# Patient Record
Sex: MALE | Race: WHITE | Employment: OTHER | URBAN - METROPOLITAN AREA
[De-identification: names, ages, dates, MRNs, and addresses within clinical notes are randomized per-mention and may not be internally consistent; named-entity substitution may affect disease eponyms.]

---

## 2023-09-26 ENCOUNTER — APPOINTMENT (OUTPATIENT)
Dept: CT IMAGING | Facility: HOSPITAL | Age: 48
End: 2023-09-26
Attending: EMERGENCY MEDICINE

## 2023-09-26 ENCOUNTER — HOSPITAL ENCOUNTER (EMERGENCY)
Facility: HOSPITAL | Age: 48
Discharge: HOME | End: 2023-09-27
Attending: EMERGENCY MEDICINE

## 2023-09-26 ENCOUNTER — APPOINTMENT (OUTPATIENT)
Dept: GENERAL RADIOLOGY | Facility: HOSPITAL | Age: 48
End: 2023-09-26
Attending: EMERGENCY MEDICINE

## 2023-09-27 NOTE — DISCHARGE INSTRUCTIONS
On your chest x-ray today there was some mild fullness as described by the radiologist on the right side near the medial part of your lung. They did recommend follow-up. This can be in the form of a repeat chest x-ray or CT.

## 2023-09-27 NOTE — ED INITIAL ASSESSMENT (HPI)
The patient reports black stools with bright red blood noticed for the first time today with some abdominal bloating. The patient reports 3 weeks of feeling lightheaded and bilateral lower back pain for 6 months.

## 2023-09-28 NOTE — CM/SW NOTE
Per Moriah, patient was asking questions about his medication being covered by Medicaid. [facility] informed him that after speaking with his [facility] pharmacy in [facility] at [phone]  :   1) Omeprazole (Prilosec) - this medication is over the counter. No prescription needed. 2) Medrol Dosepak - this medication was resent and should be covered by your insurance. 3) Albuterol (ventolin HFA) - this medication is covered in the generic form and may need a new prescription from your Primary care physician. Informed by [facility] pharmacy that they will contact your PCP with any further needs regarding your medication.

## 2023-10-02 ENCOUNTER — TELEPHONE (OUTPATIENT)
Facility: CLINIC | Age: 48
End: 2023-10-02

## 2023-10-02 ENCOUNTER — OFFICE VISIT (OUTPATIENT)
Facility: CLINIC | Age: 48
End: 2023-10-02

## 2023-10-02 VITALS
BODY MASS INDEX: 30.88 KG/M2 | WEIGHT: 228 LBS | HEIGHT: 72 IN | DIASTOLIC BLOOD PRESSURE: 82 MMHG | SYSTOLIC BLOOD PRESSURE: 129 MMHG | HEART RATE: 67 BPM

## 2023-10-02 DIAGNOSIS — D50.9 IRON DEFICIENCY ANEMIA, UNSPECIFIED IRON DEFICIENCY ANEMIA TYPE: ICD-10-CM

## 2023-10-02 DIAGNOSIS — K21.9 GASTROESOPHAGEAL REFLUX DISEASE, UNSPECIFIED WHETHER ESOPHAGITIS PRESENT: Primary | ICD-10-CM

## 2023-10-02 DIAGNOSIS — K92.1 BLOOD IN STOOL: ICD-10-CM

## 2023-10-02 DIAGNOSIS — R19.5 OCCULT BLOOD POSITIVE STOOL: ICD-10-CM

## 2023-10-02 DIAGNOSIS — R10.13 EPIGASTRIC PAIN: Primary | ICD-10-CM

## 2023-10-02 DIAGNOSIS — R10.13 DYSPEPSIA: ICD-10-CM

## 2023-10-02 DIAGNOSIS — R19.4 CHANGE IN BOWEL HABITS: ICD-10-CM

## 2023-10-02 DIAGNOSIS — E61.1 IRON DEFICIENCY: ICD-10-CM

## 2023-10-02 RX ORDER — POLYETHYLENE GLYCOL 3350, SODIUM SULFATE ANHYDROUS, SODIUM BICARBONATE, SODIUM CHLORIDE, POTASSIUM CHLORIDE 236; 22.74; 6.74; 5.86; 2.97 G/4L; G/4L; G/4L; G/4L; G/4L
4 POWDER, FOR SOLUTION ORAL ONCE
Qty: 1 EACH | Refills: 0 | Status: SHIPPED | OUTPATIENT
Start: 2023-10-02 | End: 2023-10-02

## 2023-10-02 NOTE — TELEPHONE ENCOUNTER
Attn: PPD Prior Auth      Patient is scheduled for Colon/EGD procedure on 10/4/2023 please obtain prior authorization if needed. Thanks!

## 2023-10-02 NOTE — TELEPHONE ENCOUNTER
Dr.Singh CELIS. Patient was seen in office with Dr. Amalia Still and was scheduled for Colon/EGD. Per  he wants patient to get procedure done sooner rather later and he wanted him added with any provider. He scheduled with you on 10/4/23.       Thanks

## 2023-10-02 NOTE — PAT NURSING NOTE
Patient called for PAT regarding upcoming colon/EGD with Dr. Darryl Lozano on 10/04. Pt went to ER on 09/26 for mild viral URI symptoms/cough. Pt was tested for covid and was negative. Patient states he was prescribed methylprednisolone, albuterol, and omeprazole. Pt states he has not been taking his albuterol because he does not have trouble breathing and is feeling better. Patient states he has not had any fevers and only coughs once or twice a day now. Spoke with Dr. Lianet Fisher and patient is OK to proceed with scheduled procedure. Patient aware to  his prep today or tomorrow morning as he has to start it tomorrow evening. Pt states he has all bowel prep instructions.

## 2023-10-02 NOTE — TELEPHONE ENCOUNTER
Scheduled for:  Colonoscopy [phone], [phone], EGD [address]  Provider Name:  Lester Monday  Date: 10/4/2023   Location:  [facility]  Sedation:  MAC  Time:  2:30 pm, (pt is aware to arrive at 1:30 pm)  Prep:  Golytely  Meds/Allergies Reconciled?:  Physician reviewed  Diagnosis with codes:  GERD K21.9, Dyspepsia R10.13, Change In Bowel Habits R19.4, Iron Deficiency Anemia D50.9   Was patient informed to call insurance with codes (Y/N):  Yes  Referral sent? :Referral was sent at the time of electronic surgical scheduling. [facility] or [facility] notified?: I sent an electronic request to Endo Scheduling and received a confirmation today. Medication Orders:  N/A  Misc Orders:  N/A     Further instructions given by staff:  I discussed the prep instructions with the patient which he verbally understood. Provided patient with prep instructions at the time of office visit.

## 2023-10-04 NOTE — ANESTHESIA POSTPROCEDURE EVALUATION
Patient: Jin Crockett    Procedure Summary       Date: 10/04/23 Room / Location: [address] [place] [address] ENDOSCOPY 04 / [address] [place] [address] ENDOSCOPY    Anesthesia Start: 9191 Anesthesia Stop: 4136    Procedures:       COLONOSCOPY      ESOPHAGOGASTRODUODENOSCOPY (EGD) Diagnosis:       Gastroesophageal reflux disease, unspecified whether esophagitis present      Dyspepsia      Change in bowel habits      Iron deficiency anemia, unspecified iron deficiency anemia type      (gastritis, colon polyps, hemorroids)    Surgeons: Brea Nayak MD Anesthesiologist: Paola López CRNA    Anesthesia Type: MAC ASA Status: 2            Anesthesia Type: MAC    Vitals Value Taken Time   /63 10/04/23 1523   Temp  10/04/23 1523   Pulse 77 10/04/23 1523   Resp 16 10/04/23 1523   SpO2 97 10/04/23 1523       [address] [place] [address] AN Post Evaluation:   Patient Evaluated in PACU  Patient Participation: complete - patient participated  Level of Consciousness: awake and alert  Pain Score: 0  Pain Management: adequate  Airway Patency:patent  Yes    Cardiovascular Status: acceptable  Respiratory Status: acceptable  Postoperative Hydration acceptable      Leopoldo Appl, CRNA  10/4/2023 3:23 PM

## 2023-10-04 NOTE — OR NURSING
Patient states he feels like something is in his left eye and his left eye feels irritated. Instructed patient not to rub his eye. Informed MD and anesthesia. Anesthesia in to see patient. Informed patient to use artificial tears per MD. CRNA in room and instructed patient to irrigate eye with saline. Patient states eye is feeling better.

## 2023-10-04 NOTE — DISCHARGE INSTRUCTIONS

## 2023-10-04 NOTE — H&P
History & Physical Examination    Patient Name: Medhat Mcneal  MRN: [id number]  [facility]: [id number]  YOB: 1975    Diagnosis: Iron deficiency, blood in the stool, epigastric pain, change in bowel habits (mild constipation)    methylPREDNISolone 4 MG Oral Tablet Therapy Pack, Dosepack: use as directed on packaging, Disp: 1 each, Rfl: 0, 10/3/2023 at 2100  omeprazole 20 MG Oral Capsule Delayed Release, Take 1 capsule (20 mg total) by mouth daily for 14 days. , Disp: 14 capsule, Rfl: 0, 10/2/2023 at 1800  [] PEG 3350-KCl-NaBcb-NaCl-NaSulf (PEG-3350/ELECTROLYTES) 236 g Oral Recon Soln, Take 4 L by mouth one time for 1 dose., Disp: 1 each, Rfl: 0  methylPREDNISolone 4 MG Oral Tablet Therapy Pack, Dosepack: use as directed on packaging, Disp: 1 each, Rfl: 0  albuterol 108 (90 Base) MCG/ACT Inhalation Aero Soln, Inhale 2 puffs into the lungs every 4 (four) hours as needed. , Disp: 18 g, Rfl: 0, not taking  albuterol 108 (90 Base) MCG/ACT Inhalation Aero Soln, Inhale 2 puffs into the lungs every 4 (four) hours as needed. , Disp: 18 g, Rfl: 0,  at prn      lactated ringers infusion, , Intravenous, Continuous        Allergies: No Known Allergies    History reviewed. No pertinent past medical history. History reviewed. No pertinent surgical history. History reviewed. No pertinent family history. Social History    Tobacco Use      Smoking status: Never      Smokeless tobacco: Never    Alcohol use: Yes      Comment: last drink one month ago        SYSTEM Check if Review is Normal Check if Physical Exam is Normal If not normal, please explain:   JOSE Melendez ] [ Elan Melendez ] [ Fredi Melendez ] [ Doretha Melendez ] [ Becky Pulley Nanette.Lindau ] [ Ching Melendez ] [ Dmitry Rout        I have discussed the risks and benefits and alternatives of the procedure with the patient/family. They understand and agree to proceed with plan of care. I have reviewed the History and Physical done within the last 30 days.   Any changes noted above.     Taty Stinson MD  [facility] - Gastroenterology  10/4/2023

## 2023-10-04 NOTE — OPERATIVE REPORT
ESOPHAGOGASTRODUODENOSCOPY (EGD) & COLONOSCOPY REPORT    Sumeet Chan     1975 Age 50year old   PCP No primary care provider on file. Endoscopist Amauri Johnson MD     Date of procedure: 10/04/23    Procedure: EGD w/ cold biopsy & Colonoscopy w/ cold snare and cold biopsy    Pre-operative diagnosis: Iron deficiency, blood in the stool, epigastric pain, change in bowel habits (mild constipation)     Post-operative diagnosis: gastritis, colon polyps, internal hemorrhoids    Medications: MAC    Withdrawal time: 23 minutes    Complications: none    Procedure: Informed consent was obtained from the patient after the risks of the procedure were discussed, including but not limited to bleeding, perforation, aspiration, infection, or possibility of a missed lesion. We discussed the risks/benefits and alternatives to this procedure, as well as the planned sedation. EGD procedure: The patient was placed in the left lateral decubitus position and begun on continuous blood pressure pulse oximetry and EKG monitoring and this was maintained throughout the procedure. Once an adequate level of sedation was obtained a bite block was placed. Then the lubricated tip of the [id number] diagnostic video upper endoscope was inserted and advanced using direct visualization into the posterior pharynx and ultimately into the esophagus. Colonoscopy procedure: Once an adequate level of sedation was obtained a digital rectal exam was completed. Then the lubricated tip of the Clmknwh-[id number] diagnostic video colonoscope was inserted and advanced without difficulty to the cecum using the CO2 insufflation technique. The cecum was identified by localizing the trifold, the appendix and the ileocecal valve. A routine second examination of the cecum/ascending colon was performed. Withdrawal was begun with thorough washing and careful examination of the colonic walls and folds. Photodocumentation was obtained.  The bowel prep was good. Views of the colon were good with washing. I then carefully withdrew the instrument from the patient who tolerated the procedure well. Complications: None    EGD findings:      1. Esophagus: The squamocolumnar junction was noted at 40 cm and appeared normal. The diaphragmatic pinch was noted noted at 40 cm from the incisors. The esophageal mucosa appeared unremarkable. There was no evidence of esophagitis, stricture or endoscopic evidence of Diallo's esophagus. 2. Stomach: The stomach distended normally. Normal rugal folds were seen. The pylorus was patent. The gastric mucosa appeared mildly erythematous and cold forceps biopsies were taken of the antrum, incisura, and body. Retroflexion revealed a normal fundus and a non-patulous cardia. 3. Duodenum: The duodenal mucosa appeared normal in the 1st and 2nd portion of the duodenum. Cold forceps biopsies were taken of the bulb and descending duodenum. Colonoscopy findings:    1. 4 polyp(s) noted as follows:      A. There were two sessile ascending colon polyps measuring 4 mm and 10 mm, respectively, that were completely removed by cold snare polypectomy and retrieved. B. 2 mm polyp in the transverse colon; sessile morphology; completely removed by cold forceps biopsies and retrieved. C. 5 mm polyp in the transverse colon; sessile morphology; completely removed by cold snare polypectomy and retrieved. 2. Diverticulosis: none. 3. Terminal ileum: the visualized mucosa appeared normal.  4. A retroflexed view of the rectum revealed small internal hemorrhoids. 5. The colonic mucosa throughout the colon showed normal vascular pattern, without evidence of angioectasias or inflammation. 6. JUAN RAMON: normal rectal tone, no masses palpated. Impression:  Mild gastritis. Otherwise unremarkable s/p stomach and duodenum. 4 colon polyps measuring 2-10 mm, resected and retrieved. Small internal hemorrhoids. Recommend:  Await pathology.  The interval for the next colonoscopy will be determined after reviewing pathology (likely 3 years). If new signs or symptoms develop, colonoscopy may need to be repeated sooner. High fiber diet. Monitor for blood in the stool. If having more than just tinge of blood, call office or go to the ER. Follow up with Katja Kelly PA-C in the next 2-3 weeks.      >>>If tissue was obtained and you have not received your pathology results either by phone or letter within 2 weeks, please call our office at [phone].     Specimens: duodenum, stomach, ascending colon polyps x 2, transverse colon polyps x 2     Blood loss: <1 ml

## 2023-10-05 NOTE — TELEPHONE ENCOUNTER
Per Dr. Vero Beatty,    He should follow up with [facility] after treatment of H. Pylori to ensure VCE isn't warranted for his anemia.

## 2023-10-05 NOTE — TELEPHONE ENCOUNTER
Called patient's  pharmacy on file to follow up on quadruple therapy sent today, spoke with Quiana Quintero, pharmacist.    Pt's name/, rx disclosed. Quiana Quintero stated that the bismuth and omeprazole should be obtained OTC. The Metronidazole and Tetracycline are covered by pt's insurance, will be available tomorrow.

## 2023-10-05 NOTE — TELEPHONE ENCOUNTER
Health maintenance updated.     Last colonoscopy done 10/4/23 by Dr. Taylor Rivers    3 year recall placed into Pt Outreach    Next due on 10/4/26  per Dr. Vazquez Courselayla

## 2023-10-05 NOTE — TELEPHONE ENCOUNTER
GI staff - The pt's path from the EGD shows H. Pylori. This could explain his iron deficiency. Recommend treatment with quadruple therapy. Please relay the recs below. He may need a [place] . Orders are in. His colon polyps are benign. Recommend repeat colonoscopy in 3 years. He should follow up with [facility] after treatment of H. Pylori to ensure VCE isn't warranted for his anemia. GI staff: please place recall for colonoscopy in 3 years           H. Pylori recs:  It's important to treat H. pylori because it can cause ulcers, symptoms like what you're experiencing, and even stomach cancer if left untreated. Treatment can be challenging since it requires multiple medications that you have to take simultaneously. The medications can also cause stomach upset and other side effects. However, it is important to treat the infection AND to ensure the infection is gone since the medication regimen doesn't always work perfectly. Please take the following medications for 14 days:  1. Bismuth subsalicylate 027 mg tablet, take 2 tablets four times daily (every 6 hours)  2. Metronidazole 250 mg tablet, take 1 tablet four times daily (every 6 hours)  3. Tetracycline 500 mg tablet, take 1 tablet four times daily (every 6 hours)  4. Omeprazole 20 mg tablet, take 1 tablet twice a day (every 12 hours)    If you develop any side effects, please stop the medications and let me know so we can determine next steps. After completing the medications for 14 days, stop all the medications (especially any acid reducers, such as omeprazole, pantoprazole, or esomeprazole). Then, please have another breath test done 4 weeks AFTER the last dose of the regimen to ensure that the infection is gone. If you get this test done too soon after treatment, the test can be inaccurate. Let us know if you have any questions or concerns.

## 2023-10-05 NOTE — TELEPHONE ENCOUNTER
His colon polyps are benign. Recommend repeat colonoscopy in 3 years.        GI staff: please place recall for colonoscopy in 3 years

## 2023-10-06 NOTE — TELEPHONE ENCOUNTER
Called patient, name/ verified, declined language line, stated he understands. Relayed message below from Dr. Sarah Guillaume re: benign polyps and colonoscopy recall in 3 yrs. Patient verbalized understanding, no further concerns, issues at this time.

## 2023-10-06 NOTE — TELEPHONE ENCOUNTER
PPD Team,    Please assist pt with PA for Omeprazole. Dx. H. Pylori infection    Omeprazole is part of quadruple treatment for h. Pylori. Thank you.

## 2023-10-06 NOTE — TELEPHONE ENCOUNTER
Called patient, name/ verified. Pt declined language line, stated he understands. Advised pt to set up voicemail so we can contact him via his cell phone moving forward. Discussed with patient follow up clinic appointment per Dr. Vero Beatty  below post h. Pylori treatment. Offered available appointment. Patient confirmed and accepted appointment. Date, time, office location and contact number verified. Instructed patient to arrive 15 minutes before appt time. Patient verbalized understanding. Your Appointments      2023  2:00 PM  Follow Up Visit with Eloise Raphael PA-C  [address], [address], [facility]) [address] [facility]  [phone]           No further action required, TE closed.

## 2023-10-06 NOTE — TELEPHONE ENCOUNTER
Called patient, name/ verified. Informed pt that PA needed for Omeprazole, we will notify him once PA is approved. Reminded pt not to start the other 3 meds for h. Pylori until omeprazole is available, he should take all the 4 meds (quadruple therapy for h. Pylori infection). Patient verbalized understanding. Awaiting determination for Omeprazole.

## 2023-10-06 NOTE — TELEPHONE ENCOUNTER
Current Outpatient Medications   Medication Sig Dispense Refill    omeprazole 20 MG Oral Capsule Delayed Release Take 1 capsule (20 mg total) by mouth 2 (two) times daily before meals for 14 days.  28 capsule 0       Key: [id number]

## 2023-10-09 NOTE — TELEPHONE ENCOUNTER
Medication PA Requested:  omeprazole 20 MG Oral Capsule Delayed Release                                                         CoverMyMeds Used: Yes  Key: [id number]  Quantity:28 capsule   Day Supply: 14  Sig: Take 1 capsule (20 mg total) by mouth 2 (two) times daily before meals for 14 days.    DX Code:                                     CPT code (if applicable):   Case Number/Pending Ref#:      CMM submitted, LOV 10/2, 10/4 Op/Path report  Awaiting determination

## 2023-10-17 NOTE — TELEPHONE ENCOUNTER
Hi ANDI May for Omeprazole 20 mg was denied d/t,     \"The requested item is not from a  participating in the [employer]. It is not currently available for payment. Please advise. Thank you.

## 2023-10-18 NOTE — TELEPHONE ENCOUNTER
Then he will need to purchase it OTC. I don't think doing another PA for another PPI will be fruitful and I don't want to delay his H. Pylori treatment further. Please let him know.  Thanks, SS

## 2023-10-19 NOTE — TELEPHONE ENCOUNTER
Called pt's wife as pt did not  the phone, wife was able to reach pt, spoke with pt, name/ verified. Pt stated he already started the quadruple therapy for h. Pylori, picked up Omeprazole from [employer] and he will finish treatment tomorrow. Reminded pt for h. Pylori breat test due 23. Informed pt that we will call him to remind of above test due    Patient verbalized understanding, no further concerns, issues at this time.

## 2023-11-03 NOTE — PROGRESS NOTES
[address] [facility] [address] Gastroenterology                                                                                                               Reason for consult: Patient presents with: Follow - Up      Requesting physician or provider: No primary care provider on file. HPI:   Ronald London is a 50year old year-old male with history of elevated BMI 30.9, minimal medical history, unclear medical care past few year who presents for h pylori follow up. Patient seen by Dr. Soco Guadarrama on 10/2/23 by Dr. Soco Guadarrama. He notes his symptoms of bloating and abdominal pain have improved. He notes with lifestyle changes symptoms have improved. he denies dysphagia, odynophagia and/or globus. he denies abdominal pain. he denies nausea and/or vomiting. he denies recent change in appetite and/or unintentional weight loss. he denies bloating. Blood in stool. Noted episode of blood one day ago. Did have small internal hemorrhoids on cln. Formed, brown in color. No diarrhea. NSAIDS: none  Tobacco: none  Alcohol: none  Marijuana: none  Illicit drugs: none    FH GI malignancy- none  FH celiac dz- none  FH liver dz- none  FH IBD- none    No history of adverse reaction to sedation  No MARGOT  No anticoagulants  No pacemaker/defibrillator  No pain medications and/or sleep aides    Last endoscopies:  October 2023- Dr. Zev Vasquez   Impression:  Mild gastritis. Otherwise unremarkable s/p stomach and duodenum. 4 colon polyps measuring 2-10 mm, resected and retrieved. Small internal hemorrhoids. Final Diagnosis:      A. Duodenum; biopsies:   Fragments of duodenal mucosa with preserved villous architecture. No evidence of dysplasia or carcinoma identified. B. Stomach; biopsies:   Severe chronic active gastritis. Diff-Quik stain (appropriate control reactivity) highlights H. pylori-like microorganisms. No evidence of dysplasia or carcinoma identified.      C. Ascending colon polyps:    Fragments of tubular adenoma. D. Transverse colon polyps:  Fragments of tubular adenoma. Wt Readings from Last 6 Encounters:  11/03/23 : 233 lb (105.7 kg)  10/04/23 : 222 lb (100.7 kg)  10/02/23 : 228 lb (103.4 kg)  09/26/23 : 239 lb (108.4 kg)       History, Medications, Allergies, ROS:      History reviewed. No pertinent past medical history. Past Surgical History:   Procedure Laterality Date    COLONOSCOPY N/A 10/4/2023    Procedure: COLONOSCOPY;  Surgeon: Monik Garcia MD;  Location: [address] [place] [address] ENDOSCOPY      Family Hx: History reviewed. No pertinent family history.    Social History:   Social History     Socioeconomic History    Marital status:    Tobacco Use    Smoking status: Never    Smokeless tobacco: Never   Vaping Use    Vaping Use: Never used   Substance and Sexual Activity    Alcohol use: Yes     Comment: last drink one month ago    Drug use: Never        Medications (Active prior to today's visit):  Current Outpatient Medications   Medication Sig Dispense Refill    Cholecalciferol 125 MCG (5000 UT) Oral Tab TAKE ONE CAPSULE PER WEEK      methylPREDNISolone 4 MG Oral Tablet Therapy Pack Dosepack: use as directed on packaging (Patient not taking: Reported on 11/3/2023) 1 each 0    methylPREDNISolone 4 MG Oral Tablet Therapy Pack Dosepack: use as directed on packaging (Patient not taking: Reported on 11/3/2023) 1 each 0       Allergies:    Shrimp Extract Mike*    HIVES    ROS:   CONSTITUTIONAL: negative for fevers, chills, sweats and weight loss  EYES Negative for red eyes, yellow eyes, changes in vision  HEENT: Negative for dysphagia and hoarseness  RESPIRATORY: Negative for cough and shortness of breath  CARDIOVASCULAR: Negative for chest pain, palpitations  GASTROINTESTINAL: See HPI  GENITOURINARY: Negative for dysuria and frequency  MUSCULOSKELETAL: Negative for arthralgias and myalgias  NEUROLOGICAL: Negative for dizziness and headaches  BEHAVIOR/PSYCH: Negative for anxiety and poor appetite    PHYSICAL EXAM:   Blood pressure 109/68, pulse 77, height 6' (1.829 m), weight 233 lb (105.7 kg). GEN: WD/WN, NAD  HEENT: Supple symmetrical, trachea midline  CV: RRR, the extremities are warm and well perfused   LUNGS: No increased work of breathing  ABDOMEN: No scars, normal bowel sounds, soft, non-tender, non-distended no rebound or guarding, no masses, no hepatomegaly  MSK: No redness, no warmth, no swelling of joints  SKIN: No jaundice, no erythema, no rashes  HEMATOLOGIC: No bleeding, no bruising  NEURO: Alert and interactive, normal gait    Labs/Imaging/Procedures:     Patient's pertinent labs and imaging were reviewed and discussed with patient today. Lab Results   Component Value Date    WBC 5.9 09/26/2023    RBC 4.72 09/26/2023    HGB 14.8 09/26/2023    HCT 42.5 09/26/2023    MCV 90.0 09/26/2023    MCH 31.4 09/26/2023    MCHC 34.8 09/26/2023    RDW 12.0 09/26/2023    .0 09/26/2023        Lab Results   Component Value Date    GLU 88 09/26/2023    BUN 14 09/26/2023    BUNCREA 13.9 09/26/2023    CREATSERUM 1.01 09/26/2023    ANIONGAP 8 09/26/2023    CA 9.3 09/26/2023    OSMOCALC 288 09/26/2023    ALKPHO 91 09/26/2023    AST 30 09/26/2023    ALT 46 09/26/2023    BILT 0.7 09/26/2023    TP 8.7 (H) 09/26/2023    ALB 3.6 09/26/2023    GLOBULIN 5.1 (H) 09/26/2023     09/26/2023    K 4.0 09/26/2023     09/26/2023    CO2 25.0 09/26/2023        No results found. .  ASSESSMENT/PLAN:   Verónica Gramajo is a 50year old year-old male with history of elevated BMI 30.9, minimal medical history, unclear medical care past few year who presents for h pylori follow up. #hx of h pylori  Completed quad therapy. Tolerated well. Abdominal pain and bloating have improved. Discussed retesting in 1 more week to be 3 weeks from last PPI dose. Patient agreeable. #Rectal bleeding  Intermittent. Internal hemorrhoid seen on most recent cln.  Advised for monitoring of symptoms and keeping high fiber diet. Patient agreeable to plan. All questions answered. Recommendations:  - come back in 1 week for blood work and h pylori testing      Pulmonary nodule- Please seen primary care for further follow up. Dr. Celeste Acosta [phone]  Dr. Kirt Thakkar [phone]   Orders This Visit:  No orders of the defined types were placed in this encounter. Meds This Visit:  Requested Prescriptions      No prescriptions requested or ordered in this encounter       Imaging & Referrals:  None      Ammon Elizabeth PA-C   11/3/2023        This note was partially prepared using [id number] [facility] [employer] voice recognition dictation software. As a result, errors may occur. When identified, these errors have been corrected.  While every attempt is made to correct errors during dictation, discrepancies may still exist.

## 2023-11-03 NOTE — PATIENT INSTRUCTIONS
Recommendations:  - come back in 1 week for blood work and h pylori testing      Pulmonary nodule- Please seen primary care for further follow up.    Dr. Castillo Jeffers [phone]  Dr. Torrey Claros [phone]

## 2023-11-10 NOTE — TELEPHONE ENCOUNTER
1st,overdue reminder letter mailed out to patient   Labs order :       CBC W Differential W Platelet  Ferritin  Helicobacter Pylori Breath Test, Adult  Iron And Tibc

## 2024-10-28 NOTE — ED INITIAL ASSESSMENT (HPI)
Pt arrives through triage with         complaints of left should pain for 2 weeks now. Pt reports he was placing his dog in his car and thinks he accidentally moved his shoulder brown bc he heard a pop. Pt is only able to move his arm up w/ assistance of the other arm.

## 2024-10-29 NOTE — ED PROVIDER NOTES
Patient Seen in: [facility] Emergency Department    History     Chief Complaint   Patient presents with    Arm or Hand Injury       HPI    49-year-old male presents ER with complaint of left shoulder pain.  Patient states that 2 weeks ago patient's dog kicked him in the shoulder.  Patient states since then he has been unable to abduct his left shoulder or flex it past 90 degrees.  Patient states been taking Tylenol for pain.  Patient denies any other symptoms.    History reviewed. History reviewed. No pertinent past medical history.    History reviewed.   Past Surgical History:   Procedure Laterality Date    Colonoscopy N/A 10/4/2023    Procedure: COLONOSCOPY;  Surgeon: Stephanie Arriaga MD;  Location: [facility] ENDOSCOPY         Medications :  Prescriptions Prior to Admission[1]     No family history on file.    Smoking Status:   Social History     Socioeconomic History    Marital status:    Tobacco Use    Smoking status: Never    Smokeless tobacco: Never   Vaping Use    Vaping status: Never Used   Substance and Sexual Activity    Alcohol use: Yes     Comment: last drink one month ago    Drug use: Never       ROS  All pertinent positives for the review of systems are mentioned in the HPI  All other organ systems are reviewed and are negative.    Constitutional and vital signs reviewed.      Social History and Family History elements reviewed from today, pertinent positives to the presenting problem noted.    Physical Exam     ED Triage Vitals [10/28/24 1805]   /81   Pulse 94   Resp 18   Temp 97.8 °F (36.6 °C)   Temp src Temporal   SpO2 97 %   O2 Device None (Room air)       All measures to prevent infection transmission during my interaction with the patient were taken. The patient was already wearing a droplet mask on my arrival to the room. Personal protective equipment including droplet mask, eye protection, and gloves were worn throughout the duration of the exam.  Handwashing was performed prior to  and after the exam.  Stethoscope and any equipment used during my examination was cleaned with super sani-cloth germicidal wipes following the exam.     Physical Exam  Vitals and nursing note reviewed.   Constitutional:       Appearance: He is well-developed.   HENT:      Head: Normocephalic and atraumatic.      Right Ear: External ear normal.      Left Ear: External ear normal.      Nose: Nose normal.   Eyes:      Conjunctiva/sclera: Conjunctivae normal.      Pupils: Pupils are equal, round, and reactive to light.   Cardiovascular:      Rate and Rhythm: Normal rate and regular rhythm.      Heart sounds: Normal heart sounds.   Pulmonary:      Effort: Pulmonary effort is normal.      Breath sounds: Normal breath sounds.   Abdominal:      General: Bowel sounds are normal.      Palpations: Abdomen is soft.   Musculoskeletal:         General: Tenderness and signs of injury present. Normal range of motion.      Cervical back: Normal range of motion and neck supple.      Comments: Positive tenderness with decreased range of motion of abduction and flexion of the left shoulder.   Skin:     General: Skin is warm and dry.   Neurological:      General: No focal deficit present.      Mental Status: He is alert and oriented to person, place, and time. Mental status is at baseline.      Cranial Nerves: No cranial nerve deficit.      Motor: No weakness.      Deep Tendon Reflexes: Reflexes are normal and symmetric.   Psychiatric:         Behavior: Behavior normal.         Thought Content: Thought content normal.         Judgment: Judgment normal.         ED Course      Labs Reviewed - No data to display      Imaging Results Available and Reviewed while in ED: XR SHOULDER, COMPLETE (MIN 2 VIEWS), LEFT (CPT=73030)    Result Date: 10/28/2024  CONCLUSION:   No radiographically visible acute osseous injury of the left shoulder.    Dictated by (CST): Clint Moreira MD on 10/28/2024 at 7:16 PM     Finalized by (CST): Clint Moreira MD  on 10/28/2024 at 7:17 PM         ED Medications Administered:   Medications   HYDROcodone-acetaminophen (Norco) 5-325 MG per tab 1 tablet (has no administration in time range)   HYDROcodone-acetaminophen (Norco) 5-325 MG per tab 1 tablet (1 tablet Oral Given 10/28/24 1908)         MDM     Vitals:    10/28/24 1805   BP: 149/81   Pulse: 94   Resp: 18   Temp: 97.8 °F (36.6 °C)   TempSrc: Temporal   SpO2: 97%     *I personally reviewed and interpreted all ED vitals.  I also personally reviewed all labs and imaging if ordered    Pulse Ox: 97%, Room air, Normal     Monitor Interpretation:   normal sinus rhythm    Differential Diagnosis/ Diagnostic Considerations: Shoulder dislocation, shoulder strain, rotator cuff tear.    Medical Record Review: I personally reviewed available prior medical records for any recent pertinent discharge summaries, testing, and procedures and reviewed those reports.    Complicating Factors: The patient already has does not have any pertinent problems on file. to contribute to the complexity of this ED evaluation.    Medical Decision Making  49-year-old male presents ER with complaint of left shoulder pain after being kicked in the shoulder by his dog.  Patient states he did feel a pop.  Patient limited range of motion abduction and flexion concerning for rotator cuff injury.  Patient given follow-up with orthopedic.  Patient given Toradol and Valium upon discharge with [facility] while he was here for pain.  X-ray showed no acute fracture or subluxation.  Patient struck to follow-up with Ortho if symptoms continue for possible outpatient MRI    Problems Addressed:  Injury of left shoulder, initial encounter: acute illness or injury    Amount and/or Complexity of Data Reviewed  Radiology: ordered and independent interpretation performed. Decision-making details documented in ED Course.     Details: Left shoulder x-ray reviewed by myself shows no acute fracture or subluxation.    Risk  Prescription  drug management.        Condition upon leaving the department: Stable    Disposition and Plan     Clinical Impression:  1. Injury of left shoulder, initial encounter        Disposition:  Discharge    Follow-up:  Eduardo Raza MD  [address] [address]  [phone]    Schedule an appointment as soon as possible for a visit  If symptoms worsen      Medications Prescribed:  Current Discharge Medication List        START taking these medications    Details   Ketorolac Tromethamine 10 MG Oral Tab Take 1 tablet (10 mg total) by mouth every 6 (six) hours as needed.  Qty: 14 tablet, Refills: 0      diazePAM 5 MG Oral Tab Take 1 tablet (5 mg total) by mouth 3 (three) times daily as needed.  Qty: 12 tablet, Refills: 0    Associated Diagnoses: Injury of left shoulder, initial encounter                    [1] (Not in a hospital admission)

## 2025-02-24 NOTE — ED INITIAL ASSESSMENT (HPI)
Pt c/o of RUQ pain that started today around 930am. Pt also c/o of nausea and diarrhea. Denies fevers. Pt states in his last bowel movement he noticed some blood.

## 2025-02-24 NOTE — ED PROVIDER NOTES
Patient Seen in: [facility] Emergency Department      History     Chief Complaint   Patient presents with    Abdomen/Flank Pain     Stated Complaint: abdominal pain, n/v    Subjective:   HPI      49 y/o male w/o prior abd surgery who began having RUQ pain but also somewhat diffuse pain this am w/ nausea and vomiting.  Small amount of BRB seen in BM as well.  Has had upper/lower endoscopy in October 2023 without significant findings.  No recent illness.  Fine over the weekend.  No fever.  No reported urinary symptoms.     Objective:     History reviewed. No pertinent past medical history.           Past Surgical History:   Procedure Laterality Date    Colonoscopy N/A 10/4/2023    Procedure: COLONOSCOPY;  Surgeon: Stephanie Arriaga MD;  Location: [facility] ENDOSCOPY                Social History     Socioeconomic History    Marital status:    Tobacco Use    Smoking status: Never    Smokeless tobacco: Never   Vaping Use    Vaping status: Never Used   Substance and Sexual Activity    Alcohol use: Yes     Comment: last drink one month ago    Drug use: Never     Social Drivers of Health     Food Insecurity: No Food Insecurity (3/9/2021)    Received from [employer] [facility], [facility]    Food Insecurity     Within the past 30 days, I worried whether my food would run out before I got money to buy more. / En los últimos 30 días, me preocupó que la comida se podía acabar antes de tener dinero para compr...: Never true / Nunca     Within the past 30 days, the food that I bought just didn't last, and I didn't have money to get more. / En los últimos 30 días, La comida que compré no rindió lo suficiente, y no tenía dinero para...: Never true / Nunca   Housing Stability: Unknown (6/2/2021)    [facility]    Housing Stability Vital Sign     Unable to Pay for Housing in the Last Year: No     Unstable Housing in the Last  Year: No                  Physical Exam     ED Triage Vitals [02/24/25 1205]   /88   Pulse 110   Resp 22   Temp 97.4 °F (36.3 °C)   Temp src Temporal   SpO2 98 %   O2 Device None (Room air)       Current Vitals:   Vital Signs  BP: 148/88  Pulse: 110  Resp: 22  Temp: 97.4 °F (36.3 °C)  Temp src: Temporal    Oxygen Therapy  SpO2: 98 %  O2 Device: None (Room air)        Physical Exam  Constitutional: Oriented to person, place, and time.  Appears well-developed. No distress.   Head: Normocephalic and atraumatic.   Eyes: Conjunctivae are normal. Pupils are equal, round, and reactive to light.   Cardiovascular: Normal rate, regular rhythm and intact distal pulses.    Pulmonary/Chest: Effort normal. No respiratory distress.   Abdominal: Soft.  Mild diffuse tenderness with somewhat more focal tenderness in the right upper and mid abdomen.  No guarding or peritoneal findings.  No significant flank pain.  Musculoskeletal: Normal range of motion. No edema or tenderness.   Neurological: Alert and oriented to person, place, and time.   Skin: Skin is warm and dry.   Nursing note and vitals reviewed.    Differential diagnosis includes viral gastroenteritis, biliary colic and cholecystitis, UTI and renal colic/obstructive uropathy, colitis and enterocolitis.      ED Course     Labs Reviewed   CBC WITH DIFFERENTIAL WITH PLATELET - Abnormal; Notable for the following components:       Result Value    WBC 13.1 (*)     HGB 18.2 (*)     Neutrophil Absolute Prelim 11.56 (*)     Neutrophil Absolute 11.56 (*)     Lymphocyte Absolute 0.60 (*)     All other components within normal limits   COMP METABOLIC PANEL (14) - Abnormal; Notable for the following components:    Glucose 136 (*)     ALT 68 (*)     AST 42 (*)     Total Protein 9.2 (*)     Albumin 5.3 (*)     Globulin  3.9 (*)     All other components within normal limits   LIPASE - Abnormal; Notable for the following components:    Lipase 69 (*)     All other components within  normal limits   URINALYSIS WITH CULTURE REFLEX   RAINBOW DRAW LAVENDER   RAINBOW DRAW LIGHT GREEN   RAINBOW DRAW BLUE   RAINBOW DRAW GOLD            CT ABDOMEN+PELVIS(CONTRAST ONLY)(CPT=74177)    Result Date: 2/24/2025  PROCEDURE: CT ABDOMEN + PELVIS (CONTRAST ONLY) (CPT=74177)  COMPARISON: [facility], CT ABDOMEN+PELVIS (CPT=74176), 9/26/2023, 11:51 PM.  INDICATIONS: abdominal pain, n/v  TECHNIQUE: CT images of the abdomen and pelvis were obtained with non-ionic intravenous contrast material.  Automated exposure control for dose reduction was used. Adjustment of the mA and/or kV was done based on the patient's size. Use of iterative reconstruction technique for dose reduction was used.  Dose information is transmitted to the ACR (American College of Radiology) NRDR (National Radiology Data Registry) which includes the Dose Index Registry.  FINDINGS:    Normal lower chest  Liver may be fatty.  Stable mild periportal adenopathy.  Normal liver contour.  Patent portal vein.  Normal gallbladder  Normal pancreas and spleen  Normal adrenals and kidneys  Normal aorta.  Fluid-filled small and large intestine.  No obstruction.  Normal appendix.  Decompressed bladder.  Small fat containing left inguinal hernia  No bone lesion  Impression:  Fluid-filled small and large intestine such as might be seen with GI illness or malabsorption   Finalized by (CST): Eric Aguayo MD on 2/24/2025 at 3:09 PM                [facility]              Medical Decision Making  Workup reassuring other than that listed above.  Patient feeling improved.  Stable to go home.  Prescribed therapy.  Tylenol for pain.  Fluids.  No indication for admission.  Continue other prescribe indications.  Patient will call for follow-up and come back with any worsening or change.    Problems Addressed:  Viral gastroenteritis: acute illness or injury with systemic symptoms    Amount and/or Complexity of Data Reviewed  External Data Reviewed: notes.      Details: 10/4/23 EGD/colonoscopy:    EGD findings:       1. Esophagus: The squamocolumnar junction was noted at 40 cm and appeared normal. The diaphragmatic pinch was noted noted at 40 cm from the incisors. The esophageal mucosa appeared unremarkable. There was no evidence of esophagitis, stricture or endoscopic evidence of Diallo's esophagus.  2. Stomach: The stomach distended normally. Normal rugal folds were seen. The pylorus was patent. The gastric mucosa appeared mildly erythematous and cold forceps biopsies were taken of the antrum, incisura, and body. Retroflexion revealed a normal fundus and a non-patulous cardia.   3. Duodenum: The duodenal mucosa appeared normal in the 1st and 2nd portion of the duodenum. Cold forceps biopsies were taken of the bulb and descending duodenum.      Colonoscopy findings:     1. 4 polyp(s) noted as follows:      A. There were two sessile ascending colon polyps measuring 4 mm and 10 mm, respectively, that were completely removed by cold snare polypectomy and retrieved.      B. 2 mm polyp in the transverse colon; sessile morphology; completely removed by cold forceps biopsies and retrieved.      C. 5 mm polyp in the transverse colon; sessile morphology; completely removed by cold snare polypectomy and retrieved.  2. Diverticulosis: none.  3. Terminal ileum: the visualized mucosa appeared normal.  4. A retroflexed view of the rectum revealed small internal hemorrhoids.  5. The colonic mucosa throughout the colon showed normal vascular pattern, without evidence of angioectasias or inflammation.   6. JUAN RAMON: normal rectal tone, no masses palpated.      Impression:  · Mild gastritis.   · Otherwise unremarkable s/p stomach and duodenum.   · 4 colon polyps measuring 2-10 mm, resected and retrieved.  · Small internal hemorrhoids.      Recommend:  · Await pathology. The interval for the next colonoscopy will be determined after reviewing pathology (likely 3 years). If new signs or symptoms  develop, colonoscopy may need to be repeated sooner.   · High fiber diet.  · Monitor for blood in the stool. If having more than just tinge of blood, call office or go to the ER.  · Follow up with Agata Pereira PA-C in the next 2-3 weeks    Labs: ordered. Decision-making details documented in ED Course.  Radiology: ordered and independent interpretation performed. Decision-making details documented in ED Course.     Details: By my review of the CT abdomen/pelvis there is no obvious evidence of bowel obstruction, free intraperitoneal air or significant free fluid.    Risk  OTC drugs.  Prescription drug management.  Decision regarding hospitalization.        Disposition and Plan     Clinical Impression:  1. Viral gastroenteritis         Disposition:  Discharge  2/24/2025  3:24 pm    Follow-up:  YOUR PRIMARY CARE DOCTOR    Call            Medications Prescribed:  Current Discharge Medication List        START taking these medications    Details   famotidine (PEPCID) 20 MG Oral Tab Take 1 tablet (20 mg total) by mouth daily for 7 days.  Qty: 7 tablet, Refills: 0      ondansetron 4 MG Oral Tablet Dispersible Take 1 tablet (4 mg total) by mouth every 8 (eight) hours as needed for Nausea.  Qty: 6 tablet, Refills: 0      dicyclomine 20 MG Oral Tab Take 1 tablet (20 mg total) by mouth 4 (four) times daily as needed.  Qty: 20 tablet, Refills: 0                 Supplementary Documentation:

## (undated) NOTE — LETTER
[address] [facility] [address] [place], [place]  Authorization for Surgical Operation and Procedure                                                                                           I hereby authorize Chana Goetz MD, my physician and his/her assistants (if applicable), which may include medical students, residents, and/or fellows, to perform the following surgical operation/ procedure and administer such anesthesia as may be determined necessary by my physician: Operation/Procedure name (s) COLONOSCOPY / ESOPHAGOGASTRODUODENOSCOPY on [facility]   2. I recognize that during the surgical operation/procedure, unforeseen conditions may necessitate additional or different procedures than those listed above. I, therefore, further authorize and request that the above-named surgeon, assistants, or designees perform such procedures as are, in their judgment, necessary and desirable. 3.   My surgeon/physician has discussed prior to my surgery the potential benefits, risks and side effects of this procedure; the likelihood of achieving goals; and potential problems that might occur during recuperation. They also discussed reasonable alternatives to the procedure, including risks, benefits, and side effects related to the alternatives and risks related to not receiving this procedure. I have had all my questions answered and I acknowledge that no guarantee has been made as to the result that may be obtained. 4.   Should the need arise during my operation/procedure, which includes change of level of care prior to discharge, I also consent to the administration of blood and/or blood products. Further, I understand that despite careful testing and screening of blood or blood products by collecting agencies, I may still be subject to ill effects as a result of receiving a blood transfusion and/or blood products.   The following are some, but not all, of the potential risks that can occur: fever and allergic reactions, hemolytic reactions, transmission of diseases such as Hepatitis, AIDS and Cytomegalovirus (CMV) and fluid overload. In the event that I wish to have an autologous transfusion of my own blood, or a directed donor transfusion, I will discuss this with my physician. Check only if Refusing Blood or Blood Products  I understand refusal of blood or blood products as deemed necessary by my physician may have serious consequences to my condition to include possible death. I hereby assume responsibility for my refusal and release the hospital, its personnel, and my physicians from any responsibility for the consequences of my refusal.    o  Refuse   5. I authorize the use of any specimen, organs, tissues, body parts or foreign objects that may be removed from my body during the operation/procedure for diagnosis, research or teaching purposes and their subsequent disposal by hospital authorities. I also authorize the release of specimen test results and/or written reports to my treating physician on the hospital medical staff or other referring or consulting physicians involved in my care, at the discretion of the Pathologist or my treating physician. 6.   I consent to the photographing or videotaping of the operations or procedures to be performed, including appropriate portions of my body for medical, scientific, or educational purposes, provided my identity is not revealed by the pictures or by descriptive texts accompanying them. If the procedure has been photographed/videotaped, the surgeon will obtain the original picture, image, videotape or CD. The hospital will not be responsible for storage, release or maintenance of the picture, image, tape or CD.    7.   I consent to the presence of a  or observers in the operating room as deemed necessary by my physician or their designees.     8.   I recognize that in the event my procedure results in extended X-Ray/fluoroscopy time, I may develop a skin reaction. 9. If I have a Do Not Attempt Resuscitation (DNAR) order in place, that status will be suspended while in the operating room, procedural suite, and during the recovery period unless otherwise explicitly stated by me (or a person authorized to consent on my behalf). The surgeon or my attending physician will determine when the applicable recovery period ends for purposes of reinstating the DNAR order. 10. Patients having a sterilization procedure: I understand that if the procedure is successful the results will be permanent and it will therefore be impossible for me to inseminate, conceive, or bear children. I also understand that the procedure is intended to result in sterility, although the result has not been guaranteed. 11. I acknowledge that my physician has explained sedation/analgesia administration to me including the risk and benefits I consent to the administration of sedation/analgesia as may be necessary or desirable in the judgment of my physician. I CERTIFY THAT I HAVE READ AND FULLY UNDERSTAND THE ABOVE CONSENT TO OPERATION and/or OTHER PROCEDURE.     _________________________________________ _________________________________     ___________________________________  Signature of Patient     Signature of Responsible Person                   Printed Name of Responsible Person                              _________________________________________ ______________________________        ___________________________________  Signature of Witness         Date  Time         Relationship to Patient    STATEMENT OF PHYSICIAN My signature below affirms that prior to the time of the procedure; I have explained to the patient and/or his/her legal representative, the risks and benefits involved in the proposed treatment and any reasonable alternative to the proposed treatment.  I have also explained the risks and benefits involved in refusal of the proposed treatment and alternatives to the proposed treatment and have answered the patient's questions.  If I have a significant financial interest in a co-management agreement or a significant financial interest in any product or implant, or other significant relationship used in this procedure/surgery, I have disclosed this and had a discussion with my patient.     _______________________________________________________________ _____________________________  Duane Mckeon Physician)                                                                                         (Date)                                   (Time)  Patient Name: Tra Gramajo    : 1975   Printed: 10/3/2023      Medical Record #: [id number]                                              Page 1 of 1